# Patient Record
Sex: MALE | Race: WHITE | NOT HISPANIC OR LATINO | URBAN - METROPOLITAN AREA
[De-identification: names, ages, dates, MRNs, and addresses within clinical notes are randomized per-mention and may not be internally consistent; named-entity substitution may affect disease eponyms.]

---

## 2019-12-10 ENCOUNTER — OFFICE (OUTPATIENT)
Dept: URBAN - METROPOLITAN AREA CLINIC 11 | Facility: CLINIC | Age: 84
End: 2019-12-10
Payer: COMMERCIAL

## 2019-12-10 VITALS
SYSTOLIC BLOOD PRESSURE: 139 MMHG | WEIGHT: 180 LBS | HEART RATE: 57 BPM | HEIGHT: 68 IN | DIASTOLIC BLOOD PRESSURE: 58 MMHG

## 2019-12-10 DIAGNOSIS — R10.13 EPIGASTRIC PAIN: ICD-10-CM

## 2019-12-10 DIAGNOSIS — K86.2 CYST OF PANCREAS: ICD-10-CM

## 2019-12-10 PROCEDURE — 99203 OFFICE O/P NEW LOW 30 MIN: CPT | Performed by: INTERNAL MEDICINE

## 2019-12-10 RX ORDER — PANTOPRAZOLE SODIUM 40 MG/1
40 TABLET, DELAYED RELEASE ORAL
Qty: 90 | Refills: 3 | Status: COMPLETED
Start: 2019-12-10 | End: 2020-11-03

## 2019-12-10 NOTE — SERVICEHPINOTES
He presents for evaluation of a recurrent abdominal pain.  He stated that after eating dinner that he has had some upper abdominal pain that might radiate around to his LUQ and RUQ.  He has been seen in the ER this summer for the issue.  He has the pain a couple of days a week.  The pain can be sharp at times.  He has noted that taking "a couple of gas pills" will help.  He has recently been taking Pepcid OTC for the past 10 days and this has imroved.  He has not had melena.  He has not had heartburn or dyshpagia. His last EGD was in 2015 with a ring and erosive gastritis. He stated that he has a "hernia in his testicles for a couple of years".  He has not been sure about having it fixed.  It has not been painful. He has had a stable pancreatic cyst (for over 10 years).

## 2019-12-10 NOTE — SERVICENOTES
We discussed the epigastric pain issues, prior ulcers, and a trial of Protonix prior to doing a repeat EGD.  We also discussed his pancreatic cyst and that if the pain continues and the EGD is negative we can do a repeat CT (thought this has been stable for years).  We also discussed his prior serrated adenoma and f/u colon next year as his health allows.

## 2020-01-27 ENCOUNTER — OFFICE (OUTPATIENT)
Dept: URBAN - METROPOLITAN AREA CLINIC 11 | Facility: CLINIC | Age: 85
End: 2020-01-27
Payer: COMMERCIAL

## 2020-01-27 VITALS
DIASTOLIC BLOOD PRESSURE: 64 MMHG | WEIGHT: 181 LBS | HEIGHT: 68 IN | HEART RATE: 62 BPM | SYSTOLIC BLOOD PRESSURE: 143 MMHG

## 2020-01-27 DIAGNOSIS — R10.9 UNSPECIFIED ABDOMINAL PAIN: ICD-10-CM

## 2020-01-27 DIAGNOSIS — K40.91 UNILATERAL INGUINAL HERNIA, WITHOUT OBSTRUCTION OR GANGRENE,: ICD-10-CM

## 2020-01-27 DIAGNOSIS — K86.2 CYST OF PANCREAS: ICD-10-CM

## 2020-01-27 PROCEDURE — 99214 OFFICE O/P EST MOD 30 MIN: CPT | Performed by: INTERNAL MEDICINE

## 2020-01-27 NOTE — SERVICENOTES
We discussed his improvement on the Protonx but that with the continue symptoms, I would like to proceed with an evaluation including EGD (r/o ulcerations/gastritis/etc...).  We discussed a Ct to f/u the pancreatic cyst/inguinal hernia but he stated that he had one about 6 months ago or so.  I have requested the reports.  We discussed a possible surgical repair of the hernia as well depending on his CT and EGD findings.

## 2020-01-27 NOTE — SERVICEHPINOTES
He prensents for f/u of his abdominal pain.  he ahs been on the Protonix and has thought that it helped.  However he has still had some issues with "a pain down in my stomach".  He has noted that over eating may increase it.  Overall it has improved but has not resolved.  He has not had n/v.  He has not had constipation or diarrhea.  He has not had heartburn or acid reflux.  He has a hx of a pancreatic cysts which had been stable.He has reported an inguinal hernia.  It has not been tender and comes and goes.  He stated that it reduces with lying down.

## 2020-02-26 ENCOUNTER — AMBULATORY SURGICAL CENTER (OUTPATIENT)
Dept: URBAN - METROPOLITAN AREA SURGERY 3 | Facility: SURGERY | Age: 85
End: 2020-02-26
Payer: COMMERCIAL

## 2020-02-26 VITALS
TEMPERATURE: 97.5 F | RESPIRATION RATE: 17 BRPM | SYSTOLIC BLOOD PRESSURE: 103 MMHG | WEIGHT: 172 LBS | RESPIRATION RATE: 14 BRPM | RESPIRATION RATE: 19 BRPM | SYSTOLIC BLOOD PRESSURE: 122 MMHG | SYSTOLIC BLOOD PRESSURE: 178 MMHG | HEART RATE: 55 BPM | OXYGEN SATURATION: 95 % | HEART RATE: 55 BPM | RESPIRATION RATE: 17 BRPM | HEART RATE: 52 BPM | HEART RATE: 61 BPM | OXYGEN SATURATION: 95 % | RESPIRATION RATE: 14 BRPM | DIASTOLIC BLOOD PRESSURE: 69 MMHG | TEMPERATURE: 97.5 F | HEART RATE: 61 BPM | DIASTOLIC BLOOD PRESSURE: 71 MMHG | TEMPERATURE: 97.5 F | DIASTOLIC BLOOD PRESSURE: 75 MMHG | RESPIRATION RATE: 14 BRPM | SYSTOLIC BLOOD PRESSURE: 122 MMHG | HEART RATE: 52 BPM | HEART RATE: 51 BPM | WEIGHT: 172 LBS | DIASTOLIC BLOOD PRESSURE: 71 MMHG | DIASTOLIC BLOOD PRESSURE: 75 MMHG | OXYGEN SATURATION: 98 % | OXYGEN SATURATION: 98 % | DIASTOLIC BLOOD PRESSURE: 69 MMHG | DIASTOLIC BLOOD PRESSURE: 54 MMHG | SYSTOLIC BLOOD PRESSURE: 98 MMHG | RESPIRATION RATE: 19 BRPM | SYSTOLIC BLOOD PRESSURE: 135 MMHG | HEIGHT: 68 IN | OXYGEN SATURATION: 96 % | OXYGEN SATURATION: 96 % | RESPIRATION RATE: 20 BRPM | HEART RATE: 52 BPM | DIASTOLIC BLOOD PRESSURE: 57 MMHG | RESPIRATION RATE: 20 BRPM | OXYGEN SATURATION: 97 % | OXYGEN SATURATION: 95 % | DIASTOLIC BLOOD PRESSURE: 54 MMHG | OXYGEN SATURATION: 97 % | WEIGHT: 172 LBS | SYSTOLIC BLOOD PRESSURE: 103 MMHG | SYSTOLIC BLOOD PRESSURE: 98 MMHG | HEIGHT: 68 IN | DIASTOLIC BLOOD PRESSURE: 57 MMHG | DIASTOLIC BLOOD PRESSURE: 69 MMHG | OXYGEN SATURATION: 97 % | RESPIRATION RATE: 20 BRPM | SYSTOLIC BLOOD PRESSURE: 122 MMHG | RESPIRATION RATE: 17 BRPM | SYSTOLIC BLOOD PRESSURE: 135 MMHG | DIASTOLIC BLOOD PRESSURE: 71 MMHG | OXYGEN SATURATION: 98 % | SYSTOLIC BLOOD PRESSURE: 178 MMHG | SYSTOLIC BLOOD PRESSURE: 135 MMHG | DIASTOLIC BLOOD PRESSURE: 75 MMHG | RESPIRATION RATE: 19 BRPM | SYSTOLIC BLOOD PRESSURE: 178 MMHG | HEART RATE: 61 BPM | HEART RATE: 51 BPM | SYSTOLIC BLOOD PRESSURE: 103 MMHG | OXYGEN SATURATION: 96 % | DIASTOLIC BLOOD PRESSURE: 57 MMHG | DIASTOLIC BLOOD PRESSURE: 54 MMHG | HEIGHT: 68 IN | SYSTOLIC BLOOD PRESSURE: 98 MMHG | HEART RATE: 51 BPM | HEART RATE: 55 BPM

## 2020-02-26 DIAGNOSIS — R10.13 EPIGASTRIC PAIN: ICD-10-CM

## 2020-02-26 PROCEDURE — G8907 PT DOC NO EVENTS ON DISCHARG: HCPCS | Performed by: INTERNAL MEDICINE

## 2020-02-26 PROCEDURE — G8918 PT W/O PREOP ORDER IV AB PRO: HCPCS | Performed by: INTERNAL MEDICINE

## 2020-02-26 PROCEDURE — 43235 EGD DIAGNOSTIC BRUSH WASH: CPT | Performed by: INTERNAL MEDICINE

## 2020-04-28 ENCOUNTER — OFFICE (OUTPATIENT)
Dept: URBAN - METROPOLITAN AREA CLINIC 11 | Facility: CLINIC | Age: 85
End: 2020-04-28
Payer: COMMERCIAL

## 2020-04-28 VITALS
DIASTOLIC BLOOD PRESSURE: 59 MMHG | HEART RATE: 60 BPM | HEIGHT: 68 IN | WEIGHT: 178 LBS | SYSTOLIC BLOOD PRESSURE: 146 MMHG

## 2020-04-28 DIAGNOSIS — R10.9 UNSPECIFIED ABDOMINAL PAIN: ICD-10-CM

## 2020-04-28 DIAGNOSIS — K86.2 CYST OF PANCREAS: ICD-10-CM

## 2020-04-28 PROCEDURE — 99213 OFFICE O/P EST LOW 20 MIN: CPT | Performed by: INTERNAL MEDICINE

## 2020-04-28 NOTE — SERVICENOTES
Pt was seen an examined with Stacia OWEN.  He has been doing well on the Protonix for his dyspepsia.  As long as he watches his diet with the Protonix, he has been donig well.  I would continue the Protonix for now.  As to the pancreatic cysts, it was not noted on the recent ct of the chest but his renal cysts were noted. He has a f/u CT for his lung lesion every 6 months and I would like to add a CT pancreatic protocol to his next CT chest.

## 2020-04-28 NOTE — SERVICEHPINOTES
Mr. Tinsley presents today to follow up on abdominal pain. He has a history of hypertension, hyperthyroidism, pancreatic cyst, and stomach/duodenal ulcers. He denies  N/V, heartburn, and reflux of gastric contents. He notes that he sometimes has difficulty with pills going down when he swallows too many at once. He notes that a couple of times he has overeaten and had some stomach pain, but not like he has had in the past.History of pancreatic cyst that has been stable.EGD 02/26/2020: normal esophagus, normal duodenum, and normal stomach

## 2020-11-03 ENCOUNTER — OFFICE (OUTPATIENT)
Dept: URBAN - METROPOLITAN AREA CLINIC 11 | Facility: CLINIC | Age: 85
End: 2020-11-03
Payer: COMMERCIAL

## 2020-11-03 VITALS
WEIGHT: 173 LBS | OXYGEN SATURATION: 99 % | DIASTOLIC BLOOD PRESSURE: 79 MMHG | SYSTOLIC BLOOD PRESSURE: 166 MMHG | HEART RATE: 71 BPM | HEIGHT: 68 IN

## 2020-11-03 DIAGNOSIS — K40.91 UNILATERAL INGUINAL HERNIA, WITHOUT OBSTRUCTION OR GANGRENE,: ICD-10-CM

## 2020-11-03 DIAGNOSIS — K86.2 CYST OF PANCREAS: ICD-10-CM

## 2020-11-03 DIAGNOSIS — K21.9 GASTRO-ESOPHAGEAL REFLUX DISEASE WITHOUT ESOPHAGITIS: ICD-10-CM

## 2020-11-03 PROCEDURE — 99213 OFFICE O/P EST LOW 20 MIN: CPT | Performed by: INTERNAL MEDICINE

## 2020-11-03 NOTE — SERVICENOTES
We discussed his controlled GERD and use of his meds.  We discussed hernia and referral to surgyery for evaluation.  He has requested to see Dr. Do.  We discussed his pancreatic cyst, which is likely benign, and we have requested a copy of his latest CT report.

## 2021-05-11 ENCOUNTER — OFFICE (OUTPATIENT)
Dept: URBAN - METROPOLITAN AREA CLINIC 11 | Facility: CLINIC | Age: 86
End: 2021-05-11
Payer: COMMERCIAL

## 2021-05-11 VITALS
OXYGEN SATURATION: 97 % | DIASTOLIC BLOOD PRESSURE: 80 MMHG | WEIGHT: 169 LBS | HEIGHT: 68 IN | HEART RATE: 74 BPM | SYSTOLIC BLOOD PRESSURE: 142 MMHG

## 2021-05-11 DIAGNOSIS — K86.2 CYST OF PANCREAS: ICD-10-CM

## 2021-05-11 DIAGNOSIS — R10.9 UNSPECIFIED ABDOMINAL PAIN: ICD-10-CM

## 2021-05-11 DIAGNOSIS — K40.90 UNILATERAL INGUINAL HERNIA, WITHOUT OBSTRUCTION OR GANGRENE,: ICD-10-CM

## 2021-05-11 LAB
CREATININE: 1.11 MG/DL (ref 0.76–1.27)
CREATININE: EGFR IF AFRICN AM: 67 ML/MIN/1.73 (ref 59–?)
CREATININE: EGFR IF NONAFRICN AM: 58 ML/MIN/1.73 — LOW (ref 59–?)

## 2021-05-11 PROCEDURE — 99214 OFFICE O/P EST MOD 30 MIN: CPT | Performed by: INTERNAL MEDICINE

## 2021-05-11 NOTE — SERVICENOTES
We discussed his abdominal pain issues and that this could be related to his hernia but it seems unlikely.  I would like a f/u CT to look into a cause of the pain, inguinal hernia, and pancreatic cyst (which has been stable).

## 2021-05-11 NOTE — SERVICEHPINOTES
"Ok."  He stated that he has been doing well with his heartburn and stated that he has not had acid reflux issues.  He has not had n/v or dysphagia. He has reported some issues wtih abdominal pain after eating.  The pain has been in the lower to mid abdomen after eating.  It has been "aggrevating and not a hard pain".  It has been aching and not burning or acute.  It is better after taking Tums and sometimes Gas-X.  The pain occurs about once every three days.  It is not consistent. This has been occuring "for a good while, 6 months or more".  He has thought that it might related to his inguinal hernia (he has not had pain at the hernia but in the lower abdomen). He was seen by Dr. Do and due to his age and lack of acute sx at the time, surgery was not done. The hernia has been stable in size and has not been painful. He has a pancreatic cyst which has been stable. He has had normal stools.

## 2021-06-21 ENCOUNTER — OFFICE (OUTPATIENT)
Dept: URBAN - METROPOLITAN AREA CLINIC 11 | Facility: CLINIC | Age: 86
End: 2021-06-21
Payer: COMMERCIAL

## 2021-06-21 VITALS
SYSTOLIC BLOOD PRESSURE: 133 MMHG | OXYGEN SATURATION: 98 % | DIASTOLIC BLOOD PRESSURE: 56 MMHG | WEIGHT: 172 LBS | HEIGHT: 68 IN | HEART RATE: 53 BPM

## 2021-06-21 DIAGNOSIS — K40.90 UNILATERAL INGUINAL HERNIA, WITHOUT OBSTRUCTION OR GANGRENE,: ICD-10-CM

## 2021-06-21 DIAGNOSIS — R10.13 EPIGASTRIC PAIN: ICD-10-CM

## 2021-06-21 PROCEDURE — 99214 OFFICE O/P EST MOD 30 MIN: CPT | Performed by: INTERNAL MEDICINE

## 2021-06-21 RX ORDER — PANTOPRAZOLE SODIUM 40 MG/1
80 TABLET, DELAYED RELEASE ORAL
Qty: 180 | Refills: 3 | Status: ACTIVE

## 2021-06-21 NOTE — SERVICENOTES
His hernia has been stable and he has been evaluated by surgery.  He has had some dyspepsia type issues which have improved with PRN Tums and Protonix. Prior to an EGD, I would change the Protonix to BID.  We discussed his hemorrhoids which have been and infrequent.  His pancreatic cyst has been stable and would not need further f/u.  He has sen urology about his hydronephrosis.

## 2021-06-21 NOTE — SERVICEHPINOTES
He presents for f/u of his hernia and pancreatic cyst.  He stated that he was "doin ok".  He has not had issues with recurrent abdmoinal pain.  He has not had pain with his inguinal hernia. He has had some intermittent stomach aches after eating every few days or so but he thought that this was better.  He has been taking Tums which helps. He has been on Protonix which helps as well.  FONT style="BACKGROUND-COLOR: #ffffcc" visited="true"His CT revealed a stable pancreatic cyst.  He had an enlarged left inguinal hernia (he has been evaluated by Dr. Do).  He has changes of hydronephrosis (mild).  He was seen by urology about a week ago and has had a renal scan. /FONT

## 2021-09-21 ENCOUNTER — OFFICE (OUTPATIENT)
Dept: URBAN - METROPOLITAN AREA CLINIC 11 | Facility: CLINIC | Age: 86
End: 2021-09-21
Payer: COMMERCIAL

## 2021-09-21 VITALS
WEIGHT: 167 LBS | HEIGHT: 68 IN | DIASTOLIC BLOOD PRESSURE: 66 MMHG | SYSTOLIC BLOOD PRESSURE: 123 MMHG | OXYGEN SATURATION: 98 % | HEART RATE: 74 BPM

## 2021-09-21 DIAGNOSIS — K86.2 CYST OF PANCREAS: ICD-10-CM

## 2021-09-21 DIAGNOSIS — R10.13 EPIGASTRIC PAIN: ICD-10-CM

## 2021-09-21 DIAGNOSIS — D50.9 IRON DEFICIENCY ANEMIA, UNSPECIFIED: ICD-10-CM

## 2021-09-21 LAB
CBC, PLATELET, NO DIFFERENTIAL: HEMATOCRIT: 28 % — LOW (ref 37.5–51)
CBC, PLATELET, NO DIFFERENTIAL: HEMOGLOBIN: 8.5 G/DL — LOW (ref 13–17.7)
CBC, PLATELET, NO DIFFERENTIAL: MCH: 30.1 PG (ref 26.6–33)
CBC, PLATELET, NO DIFFERENTIAL: MCHC: 30.4 G/DL — LOW (ref 31.5–35.7)
CBC, PLATELET, NO DIFFERENTIAL: MCV: 99 FL — HIGH (ref 79–97)
CBC, PLATELET, NO DIFFERENTIAL: NRBC: (no result)
CBC, PLATELET, NO DIFFERENTIAL: PLATELETS: 342 X10E3/UL (ref 150–450)
CBC, PLATELET, NO DIFFERENTIAL: RBC: 2.82 X10E6/UL — LOW (ref 4.14–5.8)
CBC, PLATELET, NO DIFFERENTIAL: RDW: 14.5 % (ref 11.6–15.4)
CBC, PLATELET, NO DIFFERENTIAL: WBC: 6.1 X10E3/UL (ref 3.4–10.8)

## 2021-09-21 PROCEDURE — 99214 OFFICE O/P EST MOD 30 MIN: CPT | Performed by: INTERNAL MEDICINE

## 2021-09-21 NOTE — SERVICENOTES
From a GI standpoint he has been doing well.  I would like to drop the Protonix to once daily.  His pancreatic cyst and hernia has been stable.  He is noted to have an anemia, likely acute blood loss with his fracture, on anticoagulation for a PE. He will need a f/u CBC.  D/w pt and daughter.

## 2021-09-21 NOTE — SERVICEHPINOTES
Imelda presents for f/u of his GERD and hernia.  He has been doing well with his prior epigastric pain on his meds.  He has been eating well. He has not had issues with heartburn or dysphagia.  He has not had issues with his meds.He has not had difficulties with his inguinal hernia.  He stated that it has not been tender or increasing in size. He has not had constipation or diarrhea. He has not had changes in his pancreatic cyst over time. He had a femur fracture which required an extended time in rehab. He has been walking about 50ft and is now weight bearing as of yesterday.  He is using a wheel chair today. His labs from 9/3 were noted and his HGB was 7.  He has not had bleeding other than from his fracture/surgery.

## 2022-01-31 ENCOUNTER — OFFICE (OUTPATIENT)
Dept: URBAN - METROPOLITAN AREA CLINIC 11 | Facility: CLINIC | Age: 87
End: 2022-01-31
Payer: COMMERCIAL

## 2022-01-31 VITALS
HEART RATE: 51 BPM | DIASTOLIC BLOOD PRESSURE: 71 MMHG | HEIGHT: 68 IN | WEIGHT: 170 LBS | SYSTOLIC BLOOD PRESSURE: 138 MMHG | OXYGEN SATURATION: 100 %

## 2022-01-31 DIAGNOSIS — D64.9 ANEMIA, UNSPECIFIED: ICD-10-CM

## 2022-01-31 PROCEDURE — 99214 OFFICE O/P EST MOD 30 MIN: CPT | Performed by: INTERNAL MEDICINE

## 2022-01-31 NOTE — SERVICEHPINOTES
He presents with his wife for f/u of his anemia.  He was recently noted to be anemic about two weeks ago and did get transfusion (x2).  He has not had obvious GI bleeding but has had some blood in his urine with a UTI.  He has been having overall normal stools about 1-2 times daily.  He has had some softer stools and is on colace and Senna (2 tabs).  He has not had dysphagia or reflux issues.  He has not had abdominal pain.
br
abdi He has a hx of a sessile serrated adenoma in 2015.  His last EGD/colon were in 2015. 
abdi pattonIn 2021 he had an anemia related to a hip frature and then PE.  His anticoagluation was stopped over a week ago.

## 2022-01-31 NOTE — SERVICENOTES
D/w pt and wife about his anemia and though the initial anemia in the summer may have been surgery/PE related, at this point, this is recurrent and I would rec a GI evlauation (rc with the sessile serrated adenoma hx and family hx) with EGD and colonoscopy.  We discussed a dif dx including ulcerations, gastritis, AVMS, polyps, cancers, etc... They were interested but wanted to talk to Dr. Sky and Dr. Best.  Of note, he has not had his left inguinal hernia repaired.